# Patient Record
Sex: MALE | URBAN - METROPOLITAN AREA
[De-identification: names, ages, dates, MRNs, and addresses within clinical notes are randomized per-mention and may not be internally consistent; named-entity substitution may affect disease eponyms.]

---

## 2019-03-14 ENCOUNTER — APPOINTMENT (RX ONLY)
Dept: URBAN - METROPOLITAN AREA CLINIC 45 | Facility: CLINIC | Age: 34
Setting detail: DERMATOLOGY
End: 2019-03-14

## 2019-03-14 DIAGNOSIS — L71.8 OTHER ROSACEA: ICD-10-CM

## 2019-03-14 DIAGNOSIS — D18.0 HEMANGIOMA: ICD-10-CM

## 2019-03-14 PROBLEM — D18.01 HEMANGIOMA OF SKIN AND SUBCUTANEOUS TISSUE: Status: ACTIVE | Noted: 2019-03-14

## 2019-03-14 PROCEDURE — ? COUNSELING

## 2019-03-14 PROCEDURE — ? PRESCRIPTION

## 2019-03-14 PROCEDURE — 99203 OFFICE O/P NEW LOW 30 MIN: CPT

## 2019-03-14 PROCEDURE — ? TREATMENT REGIMEN

## 2019-03-14 RX ORDER — DOXYCYCLINE HYCLATE 50 MG/1
TABLET, DELAYED RELEASE ORAL
Qty: 120 | Refills: 3 | Status: ERX | COMMUNITY
Start: 2019-03-14

## 2019-03-14 RX ADMIN — DOXYCYCLINE HYCLATE: 50 TABLET, DELAYED RELEASE ORAL at 15:10

## 2019-03-14 ASSESSMENT — LOCATION DETAILED DESCRIPTION DERM: LOCATION DETAILED: NASAL SUPRATIP

## 2019-03-14 ASSESSMENT — LOCATION ZONE DERM: LOCATION ZONE: NOSE

## 2019-03-14 ASSESSMENT — LOCATION SIMPLE DESCRIPTION DERM: LOCATION SIMPLE: NOSE

## 2019-03-14 NOTE — PROCEDURE: TREATMENT REGIMEN
Detail Level: Zone
Plan: Location: face\\nPrescribe: Doryx 50 mg po qd x 30 days\\n\\nDiscussed with pt that the growth on his nose is most likely an angioma, but it could also be a rosacea granuloma.\\nDiscussed with pt that rosacea granulomas are usually not common in young adults.\\nToday, pt denies his face turning red when stressed, after exercising/eating spicy foods, etc.\\n\\nEducated pt that rosacea is an immune mediated condition that irritates the blood vessels and oil glands\\nEducated him on trigger factors such as stress, spicy foods, alcohol, sunlight, etc.\\n\\nWill prescribe to help calm flare up on nasal supratip: Doryx 50 mg, 1 tablet daily\\n\\nF/u as needed
Plan: Location: nasal supratip **picture taken**\\n\\nToday, pt presents a red papule on the nasal supratip.\\nToday, pt states that the growth has been onset for several years.\\nToday, pt denies any irritation at the area, and he was not able to extract or squeeze anything from the growth in the past.\\n\\nDiscussed with pt:\\nToday, the growth appears to be a benign vascular growth.\\nWill treat with YAG laser in [place] when needed/wanted.\\nIf the growth does not resolve after treatment with the YAG laser, can remove the growth by punch in the future.\\n\\nF/u as needed (in [place] for YAG laser)

## 2019-03-15 ENCOUNTER — APPOINTMENT (RX ONLY)
Dept: URBAN - METROPOLITAN AREA CLINIC 77 | Facility: CLINIC | Age: 34
Setting detail: DERMATOLOGY
End: 2019-03-15

## 2019-03-15 DIAGNOSIS — L85.3 XEROSIS CUTIS: ICD-10-CM

## 2019-03-15 DIAGNOSIS — D18.0 HEMANGIOMA: ICD-10-CM

## 2019-03-15 PROBLEM — D18.01 HEMANGIOMA OF SKIN AND SUBCUTANEOUS TISSUE: Status: ACTIVE | Noted: 2019-03-15

## 2019-03-15 PROCEDURE — ? TREATMENT REGIMEN

## 2019-03-15 PROCEDURE — ? BENIGN DESTRUCTION (PDL)

## 2019-03-15 PROCEDURE — 17110 DESTRUCTION B9 LES UP TO 14: CPT

## 2019-03-15 PROCEDURE — ? COUNSELING

## 2019-03-15 ASSESSMENT — LOCATION DETAILED DESCRIPTION DERM
LOCATION DETAILED: LEFT ULNAR DORSAL HAND
LOCATION DETAILED: NASAL SUPRATIP
LOCATION DETAILED: RIGHT RADIAL DORSAL HAND

## 2019-03-15 ASSESSMENT — LOCATION ZONE DERM
LOCATION ZONE: HAND
LOCATION ZONE: NOSE

## 2019-03-15 ASSESSMENT — LOCATION SIMPLE DESCRIPTION DERM
LOCATION SIMPLE: RIGHT HAND
LOCATION SIMPLE: LEFT HAND
LOCATION SIMPLE: NOSE

## 2019-03-15 NOTE — PROCEDURE: TREATMENT REGIMEN
Plan: Location: Nasal tip \\nTreatment: YAG 1064nm\\n\\nPhoto taken \\n\\nPatient is here for laser treatment on the nasal tip \\nPatient has had lesion for numerous years \\n\\nDiscussed with pt that these are benign skin growths that consist of broken blood vessels. \\nPLAN:\\nToday i will be treating lesion with YAG laser \\nDiscussed with pt that the YAG device emits a range of lightwaves that are then tuned and targeted at hemoglobin (the red blood cells in the blood vessels). \\nThe light beam passes through the skin and is absorbed by either hemoglobin resulting in damage to the vessel wall. These tiny vessels are then absorbed by the body, rendering them less visible.\\n\\nDiscussed with patient if he does not notice significant improvement a biopsy may be performed in the future.\\n\\nFollow up: as needed
Detail Level: Zone

## 2019-03-15 NOTE — PROCEDURE: BENIGN DESTRUCTION (LASER)
Medical Necessity Information: It is in your best interest to select a reason for this procedure from the list below. All of these items fulfill various CMS LCD requirements except the new and changing color options.
Detail Level: Zone
Pulse Duration: 10 ms
Anesthesia Volume In Cc: 0
Laser Type: YAG 1064nm
Post-Procedure: Following the procedure vaseline and ice was applied to the treatment areas and post care was reviewed with the patient.
Pre-Procedure: Prior to the procedure all persons present put on protective eyewear.
Spot Size: 3 mm
Medical Necessity Clause: This procedure was medically necessary because the lesions that were treated were:
Include Z78.9 (Other Specified Conditions Influencing Health Status) As An Associated Diagnosis?: No
Delay Time (Ms): 20
Consent: Written consent obtained, risks reviewed including but not limited to crusting, scabbing, blistering, scarring, darker or lighter pigmentary change, incidental hair removal, bruising, and/or incomplete removal.
Post-Care Instructions: I reviewed with the patient in detail post-care instructions. Patient should stay away from the sun and wear sun protection until treated areas are fully healed.
Fluence: 8

## 2019-03-29 ENCOUNTER — APPOINTMENT (RX ONLY)
Dept: URBAN - METROPOLITAN AREA CLINIC 77 | Facility: CLINIC | Age: 34
Setting detail: DERMATOLOGY
End: 2019-03-29

## 2019-03-29 DIAGNOSIS — L85.3 XEROSIS CUTIS: ICD-10-CM

## 2019-03-29 DIAGNOSIS — D18.0 HEMANGIOMA: ICD-10-CM

## 2019-03-29 PROBLEM — D18.01 HEMANGIOMA OF SKIN AND SUBCUTANEOUS TISSUE: Status: ACTIVE | Noted: 2019-03-29

## 2019-03-29 PROCEDURE — 99213 OFFICE O/P EST LOW 20 MIN: CPT | Mod: 25

## 2019-03-29 PROCEDURE — 17110 DESTRUCTION B9 LES UP TO 14: CPT

## 2019-03-29 PROCEDURE — ? BENIGN DESTRUCTION (PDL)

## 2019-03-29 PROCEDURE — ? COUNSELING

## 2019-03-29 PROCEDURE — ? TREATMENT REGIMEN

## 2019-03-29 ASSESSMENT — LOCATION ZONE DERM
LOCATION ZONE: HAND
LOCATION ZONE: NOSE

## 2019-03-29 ASSESSMENT — LOCATION SIMPLE DESCRIPTION DERM
LOCATION SIMPLE: RIGHT HAND
LOCATION SIMPLE: NOSE
LOCATION SIMPLE: LEFT HAND

## 2019-03-29 ASSESSMENT — LOCATION DETAILED DESCRIPTION DERM
LOCATION DETAILED: RIGHT RADIAL DORSAL HAND
LOCATION DETAILED: LEFT ULNAR DORSAL HAND
LOCATION DETAILED: NASAL SUPRATIP

## 2019-03-29 NOTE — PROCEDURE: TREATMENT REGIMEN
Plan: Location: Nasal tip \\nTreatment: YAG 1064nm\\n\\nPhoto taken \\n\\nPatient is here for laser treatment on the nasal tip \\nPatient has had lesion for numerous years \\n\\nDiscussed with pt that these are benign skin growths that consist of broken blood vessels. \\nPLAN:\\nToday i will be treating lesion with YAG laser \\nDiscussed with pt that the YAG device emits a range of lightwaves that are then tuned and targeted at hemoglobin (the red blood cells in the blood vessels). \\nThe light beam passes through the skin and is absorbed by either hemoglobin resulting in damage to the vessel wall. These tiny vessels are then absorbed by the body, rendering them less visible.\\n\\nDiscussed with patient if he does not notice significant improvement a biopsy may be performed in the future.\\n\\nFollow up: as needed

## 2019-03-29 NOTE — PROCEDURE: BENIGN DESTRUCTION (LASER)
Anesthesia Volume In Cc: 0
Pulse Duration: 10 ms
Consent: Written consent obtained, risks reviewed including but not limited to crusting, scabbing, blistering, scarring, darker or lighter pigmentary change, incidental hair removal, bruising, and/or incomplete removal.
Post-Procedure: Following the procedure vaseline and ice was applied to the treatment areas and post care was reviewed with the patient.
Medical Necessity Information: It is in your best interest to select a reason for this procedure from the list below. All of these items fulfill various CMS LCD requirements except the new and changing color options.
Pre-Procedure: Prior to the procedure all persons present put on protective eyewear.
Laser Type: YAG 1064nm
Include Z78.9 (Other Specified Conditions Influencing Health Status) As An Associated Diagnosis?: No
Post-Care Instructions: I reviewed with the patient in detail post-care instructions. Patient should stay away from the sun and wear sun protection until treated areas are fully healed.
Medical Necessity Clause: This procedure was medically necessary because the lesions that were treated were:
Detail Level: Zone
Delay Time (Ms): 20
Fluence: 8
Spot Size: 3 mm